# Patient Record
Sex: MALE | Race: BLACK OR AFRICAN AMERICAN | Employment: FULL TIME | URBAN - METROPOLITAN AREA
[De-identification: names, ages, dates, MRNs, and addresses within clinical notes are randomized per-mention and may not be internally consistent; named-entity substitution may affect disease eponyms.]

---

## 2017-06-20 ENCOUNTER — OFFICE VISIT (OUTPATIENT)
Dept: FAMILY MEDICINE CLINIC | Age: 27
End: 2017-06-20

## 2017-06-20 VITALS
OXYGEN SATURATION: 98 % | RESPIRATION RATE: 18 BRPM | TEMPERATURE: 97.5 F | WEIGHT: 150 LBS | HEART RATE: 63 BPM | BODY MASS INDEX: 21 KG/M2 | SYSTOLIC BLOOD PRESSURE: 107 MMHG | HEIGHT: 71 IN | DIASTOLIC BLOOD PRESSURE: 64 MMHG

## 2017-06-20 DIAGNOSIS — M79.642 PAIN OF LEFT HAND: Primary | ICD-10-CM

## 2017-06-20 NOTE — PROGRESS NOTES
Discharge instructions reviewed with patient    Medication list and understanding of medications reviewed with patient. OTC and herbal medications reviewed and added to med list if applicable  Barriers to adherence assessed. Guidance given regarding new medications this visit, including reason for taking this medicine, and common side effects. Appointment for ortho made and 5th finger left hand splinted per Dr. Cong Cobian.

## 2017-06-20 NOTE — PROGRESS NOTES
HISTORY OF PRESENT ILLNESS  Stephanie Hartmann is a 32 y.o. male. Hand Pain    The history is provided by the patient. This is a new problem. Episode onset: New patient with c/o injury to his left pinky. He was playing basketball last week and the ball hit the end of his finger. Since then, the end of his finger has been bent down. The problem occurs constantly. The problem has not changed since onset. The pain is present in the left fingers. The pain is mild. Associated symptoms include limited range of motion. Pertinent negatives include no numbness. Review of Systems   Constitutional: Negative. Neurological: Negative for numbness. Physical Exam   Constitutional: He appears well-developed and well-nourished. Musculoskeletal:   L hand:  5th digit- distal portion of finger from DIP is in flexion. ROM of the remainder of hand wnl. No bony deformity noted. ASSESSMENT and PLAN  L hand pain: Suspect extensor tendon rupture. No splint was available so one was fashioned out of available materials to hold DIP joint in neutral position. Will refer to orthopedics for further eval/treatment as indicated.

## 2017-06-22 ENCOUNTER — OFFICE VISIT (OUTPATIENT)
Dept: ORTHOPEDIC SURGERY | Facility: CLINIC | Age: 27
End: 2017-06-22

## 2017-06-22 VITALS
BODY MASS INDEX: 20.08 KG/M2 | TEMPERATURE: 98 F | SYSTOLIC BLOOD PRESSURE: 114 MMHG | WEIGHT: 144 LBS | DIASTOLIC BLOOD PRESSURE: 61 MMHG | HEART RATE: 70 BPM

## 2017-06-22 DIAGNOSIS — M79.642 LEFT HAND PAIN: ICD-10-CM

## 2017-06-22 DIAGNOSIS — M20.012 MALLET DEFORMITY OF LEFT LITTLE FINGER: Primary | ICD-10-CM

## 2017-06-22 NOTE — PROGRESS NOTES
Patient: Aisha Silverman                MRN: [id number]       SSN: [id number]  YOB: 1990        AGE: 32 y.o. SEX: male    PCP: No primary care provider on file.  06/22/17    Chief Complaint   Patient presents with    Hand Pain     Left     HISTORY:  Aisha Silverman is a 32 y.o. male who is seen for left hand pain. The patient states that he bent his left pinky finger playing basketball on 6/15/17. The ball struck the tip of his little finger causing a hyperflexion injury. He states that the his left pinky finger does not hurt unless you apply pressure. He does not recall any prior left hand injury or trauma. No flowsheet data found. 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 He is going to be working as a  starting this week for the [employer]. He states he previously worked for [employer] as a , but has been laid off. He lives at home with his mother, step father, girlfriend and his 1year old son. Current weight is 144 pounds. He states that he has lost 6 pounds recently. He notes that he has a fast metabolism and has been exercising regularly. He is 5'11\" tall. He is not hypertensive or diabetic. His favorite football team is the [employer]. No results found for: HBA1C, HGBE8, SWR9AKIA, MDD2HWOK, BCU3THEC  Weight Metrics 6/22/2017 6/20/2017   Weight 144 lb 150 lb   BMI 20.08 kg/m2 20.92 kg/m2       There is no problem list on file for this patient. REVIEW OF SYSTEMS: All Below are Negative except: See HPI   Constitutional: negative for fever, chills, and weight loss. Cardiovascular: negative for chest pain, claudication, leg swelling, SOB, HANEY   Gastrointestinal: Negative for pain, N/V/C/D, Blood in stool or urine, dysuria,  hematuria, incontinence, pelvic pain. Musculoskeletal: See HPI   Neurological: Negative for dizziness and weakness.    Negative for headaches, Visual changes, confusion, seizures   Phychiatric/Behavioral: Negative for depression, memory loss, substance  abuse. Extremities: Negative for hair changes, rash, or skin lesion changes. Hematologic: Negative for bleeding problems, bruising, pallor or swollen lymph  nodes   Peripheral Vascular: No calf pain, no circulation deficits. Social History     Social History    Marital status: SINGLE     Spouse name: N/A    Number of children: N/A    Years of education: N/A     Occupational History    Not on file. Social History Main Topics    Smoking status: Current Some Day Smoker    Smokeless tobacco: Never Used    Alcohol use Yes      Comment: social    Drug use: No    Sexual activity: Not on file     Other Topics Concern    Not on file     Social History Narrative      No Known Allergies   No current outpatient prescriptions on file. No current facility-administered medications for this visit. PHYSICAL EXAMINATION:  Visit Vitals    /61 (BP 1 Location: Left arm, BP Patient Position: Sitting)    Pulse 70    Temp 98 °F (36.7 °C) (Oral)    Wt 144 lb (65.3 kg)    BMI 20.08 kg/m2      ORTHO EXAMINATION:  Examination Right Hand Left Hand   Skin Intact Intact   Deformity - +, mallet left little finger   Swelling - -   Tenderness - + dorsal DIPJ   Finger flexion Full Full   Finger extension Full 30 degree extensor lag DIPJ little finger   Sensation Normal Normal   Capillary refill Normal Normal   Heberden's nodes - -   Dupuytren's - -     RADIOGRAPHS:  XR LEFT HAND 6/22/17  IMPRESSION:  Three views - no fractures, no degenerative changes. IMPRESSION:      ICD-10-CM ICD-9-CM    1. 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 Left hand pain M79.642 729.5 AMB POC XRAY, FINGER(S), 2+ VIEWS     PLAN:  He was provided with a link splint for his left little finger today to wear for 2 months. He will follow up as needed.       Scribed by Karie Caban ([facility]) as dictated by [employer], MD

## 2017-06-22 NOTE — PATIENT INSTRUCTIONS
Mallet Finger: Care Instructions  Your Care Instructions  Mallet finger is a bent fingertip. It is caused by a fractured bone or torn tendon at the base of the finger joint near the fingertip. The injury can happen when a finger is bent with force, such as when trying to catch a ball and the fingertip is struck by the ball. It also is called baseball finger or drop finger. Treatment includes wearing a splint for several weeks to keep the finger straight. Surgery may be needed if pieces of bone break off during the injury or if treatment with the splint does not work. Usually only a splint is needed. It is very important that you wear and take care of the splint exactly as your doctor tells you to so that your finger heals properly and is no longer bent. Wearing a splint may interfere with your normal activities. Ask for help with daily tasks if you need it. Follow-up care is a key part of your treatment and safety. Be sure to make and go to all appointments, and call your doctor if you are having problems. It's also a good idea to know your test results and keep a list of the medicines you take. How can you care for yourself at home? · If your doctor put a splint on your finger, wear the splint exactly as directed. Do not remove it until your doctor says that you can. · Keep your hand raised above the level of your heart as much as you can. This will help reduce swelling. · Put ice or a cold pack on your finger for 10 to 20 minutes at a time. Try to do this every 1 to 2 hours for the next 3 days (when you are awake) or until the swelling goes down. Put a thin cloth between the ice and your skin. Keep the splint dry. · Take pain medicines exactly as directed. ¨ If the doctor gave you a prescription medicine for pain, take it as prescribed. ¨ If you are not taking a prescription pain medicine, ask your doctor if you can take an over-the-counter medicine. When should you call for help?   Call your doctor now or seek immediate medical care if:  · Your finger is cool or pale or changes color. · Your pain gets much worse. · You have tingling or numbness in your finger. · You have signs of infection. These include:  ¨ Increased pain, swelling, warmth, or redness. ¨ Red streaks leading from the area. ¨ Pus draining from the area. ¨ Swollen lymph nodes in the armpits. ¨ A fever. Watch closely for changes in your health, and be sure to contact your doctor if:  · You have more pain and swelling than your doctor told you to expect. Where can you learn more? Go to http://elzbieta-regine.info/. Enter Z077 in the search box to learn more about \"Mallet Finger: Care Instructions. \"  Current as of: March 21, 2017  Content Version: 11.3  © 3755-2598 [employer], [employer]. Care instructions adapted under license by "[employer]" (which disclaims liability or warranty for this information). If you have questions about a medical condition or this instruction, always ask your healthcare professional. [place] [address] any warranty or liability for your use of this information.

## 2017-06-22 NOTE — MR AVS SNAPSHOT
Visit Information Date & Time Provider Department Dept. Phone Encounter #  
 6/22/2017  8:30 AM Martha Banegas MD [facility] [place] [id number] [phone] Follow-up Instructions Return if symptoms worsen or fail to improve. Upcoming Health Maintenance Date Due Pneumococcal 19-64 Medium Risk (1 of 1 - PPSV23) 3/26/2007 DTaP/Tdap/Td series (1 - Tdap) 3/26/2009 INFLUENZA AGE 9 TO ADULT 8/1/2017 Allergies as of 6/22/2017  Review Complete On: 6/22/2017 By: Dolores Cassette No Known Allergies Current Immunizations  Never Reviewed No immunizations on file. Not reviewed this visit You Were Diagnosed With   
  
 Codes Comments Mallet deformity of left little finger    -  Primary ICD-10-CM: M20.012 ICD-9-CM: 736.1 Left hand pain     ICD-10-CM: U02.566 ICD-9-CM: 729.5 Vitals BP Pulse Temp Weight(growth percentile) BMI Smoking Status 114/61 (BP 1 Location: Left arm, BP Patient Position: Sitting) 70 98 °F (36.7 °C) (Oral) 144 lb (65.3 kg) 20.08 kg/m2 Current Some Day Smoker BMI and BSA Data Body Mass Index Body Surface Area 20.08 kg/m 2 1.81 m 2 Preferred Pharmacy Pharmacy Name Phone [id number] [address] [phone] Your Updated Medication List  
  
Notice  As of 6/22/2017  9:17 AM  
 You have not been prescribed any medications. We Performed the Following AMB POC XRAY, FINGER(S), 2+ VIEWS [60245 CPT(R)] Follow-up Instructions Return if symptoms worsen or fail to improve. Patient Instructions Mallet Finger: Care Instructions Your Care Instructions Mallet finger is a bent fingertip. It is caused by a fractured bone or torn tendon at the base of the finger joint near the fingertip.  The injury can happen when a finger is bent with force, such as when trying to catch a ball and the fingertip is struck by the ball. It also is called baseball finger or drop finger. Treatment includes wearing a splint for several weeks to keep the finger straight. Surgery may be needed if pieces of bone break off during the injury or if treatment with the splint does not work. Usually only a splint is needed. It is very important that you wear and take care of the splint exactly as your doctor tells you to so that your finger heals properly and is no longer bent. Wearing a splint may interfere with your normal activities. Ask for help with daily tasks if you need it. Follow-up care is a key part of your treatment and safety. Be sure to make and go to all appointments, and call your doctor if you are having problems. It's also a good idea to know your test results and keep a list of the medicines you take. How can you care for yourself at home? · If your doctor put a splint on your finger, wear the splint exactly as directed. Do not remove it until your doctor says that you can. · Keep your hand raised above the level of your heart as much as you can. This will help reduce swelling. · Put ice or a cold pack on your finger for 10 to 20 minutes at a time. Try to do this every 1 to 2 hours for the next 3 days (when you are awake) or until the swelling goes down. Put a thin cloth between the ice and your skin. Keep the splint dry. · Take pain medicines exactly as directed. ¨ If the doctor gave you a prescription medicine for pain, take it as prescribed. ¨ If you are not taking a prescription pain medicine, ask your doctor if you can take an over-the-counter medicine. When should you call for help? Call your doctor now or seek immediate medical care if: 
· Your finger is cool or pale or changes color. · Your pain gets much worse. · You have tingling or numbness in your finger. · You have signs of infection. These include: 
¨ Increased pain, swelling, warmth, or redness. ¨ Red streaks leading from the area. ¨ Pus draining from the area. ¨ Swollen lymph nodes in the armpits. ¨ A fever. Watch closely for changes in your health, and be sure to contact your doctor if: 
· You have more pain and swelling than your doctor told you to expect. Where can you learn more? Go to http://elzbieta-regine.info/. Enter Q617 in the search box to learn more about \"Mallet Finger: Care Instructions. \" Current as of: March 21, 2017 Content Version: 11.3 © 1750-1838 [employer]. Care instructions adapted under license by [employer] (which disclaims liability or warranty for this information). If you have questions about a medical condition or this instruction, always ask your healthcare professional. [address] any warranty or liability for your use of this information. Introducing [facility]! Vikas Allie introduces [employer] patient portal. Now you can access parts of your medical record, email your doctor's office, and request medication refills online. 1. In your internet browser, go to [email or web address] 2. Click on the First Time User? Click Here link in the Sign In box. You will see the New Member Sign Up page. 3. Enter your [employer] Access Code exactly as it appears below. You will not need to use this code after youve completed the sign-up process. If you do not sign up before the expiration date, you must request a new code. · [employer] Access Code: [email or web address] Expires: 9/18/2017 10:21 AM 
 
4. Enter the last four digits of your Social Security Number (xxxx) and Date of Birth (mm/dd/yyyy) as indicated and click Submit. You will be taken to the next sign-up page. 5. Create a Xiaozhu.comt ID. This will be your [employer] login ID and cannot be changed, so think of one that is secure and easy to remember. 6. Create a Xiaozhu.comt password. You can change your password at any time. 7. Enter your Password Reset Question and Answer. This can be used at a later time if you forget your password. 8. Enter your e-mail address. You will receive e-mail notification when new information is available in [address] 9. Click Sign Up. You can now view and download portions of your medical record. 10. Click the Download Summary menu link to download a portable copy of your medical information. If you have questions, please visit the Frequently Asked Questions section of the [facility] website. Remember, [facility] is NOT to be used for urgent needs. For medical emergencies, dial 911. Now available from your iPhone and Android! Please provide this summary of care documentation to your next provider. If you have any questions after today's visit, please call [phone].

## 2023-01-30 RX ORDER — LIDOCAINE 4 G/G
PATCH TOPICAL
COMMUNITY
Start: 2020-10-12
